# Patient Record
Sex: FEMALE | Race: BLACK OR AFRICAN AMERICAN | NOT HISPANIC OR LATINO | URBAN - METROPOLITAN AREA
[De-identification: names, ages, dates, MRNs, and addresses within clinical notes are randomized per-mention and may not be internally consistent; named-entity substitution may affect disease eponyms.]

---

## 2019-06-27 ENCOUNTER — OUTPATIENT (OUTPATIENT)
Dept: OUTPATIENT SERVICES | Facility: HOSPITAL | Age: 54
LOS: 1 days | Discharge: HOME | End: 2019-06-27

## 2019-06-27 VITALS
DIASTOLIC BLOOD PRESSURE: 70 MMHG | SYSTOLIC BLOOD PRESSURE: 129 MMHG | TEMPERATURE: 98 F | OXYGEN SATURATION: 99 % | RESPIRATION RATE: 16 BRPM | WEIGHT: 171.96 LBS | HEART RATE: 65 BPM | HEIGHT: 69 IN

## 2019-06-27 DIAGNOSIS — S93.124A DISLOCATION OF METATARSOPHALANGEAL JOINT OF RIGHT LESSER TOE(S), INITIAL ENCOUNTER: ICD-10-CM

## 2019-06-27 DIAGNOSIS — Z01.818 ENCOUNTER FOR OTHER PREPROCEDURAL EXAMINATION: ICD-10-CM

## 2019-06-27 DIAGNOSIS — M20.11 HALLUX VALGUS (ACQUIRED), RIGHT FOOT: ICD-10-CM

## 2019-06-27 DIAGNOSIS — Z01.812 ENCOUNTER FOR PREPROCEDURAL LABORATORY EXAMINATION: ICD-10-CM

## 2019-06-27 LAB
ANION GAP SERPL CALC-SCNC: 5 MMOL/L — SIGNIFICANT CHANGE UP (ref 5–17)
BUN SERPL-MCNC: 13 MG/DL — SIGNIFICANT CHANGE UP (ref 7–23)
CALCIUM SERPL-MCNC: 9.4 MG/DL — SIGNIFICANT CHANGE UP (ref 8.5–10.1)
CHLORIDE SERPL-SCNC: 109 MMOL/L — HIGH (ref 96–108)
CO2 SERPL-SCNC: 30 MMOL/L — SIGNIFICANT CHANGE UP (ref 22–31)
CREAT SERPL-MCNC: 0.78 MG/DL — SIGNIFICANT CHANGE UP (ref 0.5–1.3)
GLUCOSE SERPL-MCNC: 97 MG/DL — SIGNIFICANT CHANGE UP (ref 70–99)
HCT VFR BLD CALC: 40.7 % — SIGNIFICANT CHANGE UP (ref 34.5–45)
HGB BLD-MCNC: 13 G/DL — SIGNIFICANT CHANGE UP (ref 11.5–15.5)
MCHC RBC-ENTMCNC: 28.6 PG — SIGNIFICANT CHANGE UP (ref 27–34)
MCHC RBC-ENTMCNC: 31.9 GM/DL — LOW (ref 32–36)
MCV RBC AUTO: 89.6 FL — SIGNIFICANT CHANGE UP (ref 80–100)
NRBC # BLD: 0 /100 WBCS — SIGNIFICANT CHANGE UP (ref 0–0)
PLATELET # BLD AUTO: 215 K/UL — SIGNIFICANT CHANGE UP (ref 150–400)
POTASSIUM SERPL-MCNC: 4.2 MMOL/L — SIGNIFICANT CHANGE UP (ref 3.5–5.3)
POTASSIUM SERPL-SCNC: 4.2 MMOL/L — SIGNIFICANT CHANGE UP (ref 3.5–5.3)
RBC # BLD: 4.54 M/UL — SIGNIFICANT CHANGE UP (ref 3.8–5.2)
RBC # FLD: 12.5 % — SIGNIFICANT CHANGE UP (ref 10.3–14.5)
SODIUM SERPL-SCNC: 144 MMOL/L — SIGNIFICANT CHANGE UP (ref 135–145)
WBC # BLD: 3.36 K/UL — LOW (ref 3.8–10.5)
WBC # FLD AUTO: 3.36 K/UL — LOW (ref 3.8–10.5)

## 2019-06-27 RX ORDER — SODIUM CHLORIDE 9 MG/ML
3 INJECTION INTRAMUSCULAR; INTRAVENOUS; SUBCUTANEOUS EVERY 8 HOURS
Refills: 0 | Status: DISCONTINUED | OUTPATIENT
Start: 2019-07-05 | End: 2019-07-05

## 2019-06-27 NOTE — H&P PST ADULT - ASSESSMENT
55 y/o healthy female, with c/o of foot pain for over a yr. Started noticing that the "2nd toe was going over the big toe" for the last few months. Seen by Dr Purcell, scheduled for right foot bunionectomy and hammer toe correction of 2nd toe. Pre op testing today.  Medical eval not needed, well exam.

## 2019-06-27 NOTE — H&P PST ADULT - NSICDXPROBLEM_GEN_ALL_CORE_FT
PROBLEM DIAGNOSES  Problem: Hallux valgus with bunions, right  Assessment and Plan: Right foot bunionectomy with 2nd digit fusion with flexor tendon transfer

## 2019-06-27 NOTE — H&P PST ADULT - HISTORY OF PRESENT ILLNESS
55 y/o healthy female, with c/o of foot pain for over a yr. Started noticing that the "2nd toe was going over the big toe" for the last few months. Seen by Dr Purcell, scheduled for right foot bunionectomy and hammer toe correction of 2nd toe. Pre op testing today.

## 2019-06-27 NOTE — H&P PST ADULT - MUSCULOSKELETAL COMMENTS
right foot pain see HPI right foot bunion and 2nd hammertoe, pain wearing shoes, using tape to hold the toe in place while wearing flat shoes

## 2019-07-05 ENCOUNTER — OUTPATIENT (OUTPATIENT)
Dept: OUTPATIENT SERVICES | Facility: HOSPITAL | Age: 54
LOS: 1 days | Discharge: HOME | End: 2019-07-05
Payer: COMMERCIAL

## 2019-07-05 VITALS
DIASTOLIC BLOOD PRESSURE: 76 MMHG | RESPIRATION RATE: 14 BRPM | TEMPERATURE: 98 F | OXYGEN SATURATION: 99 % | HEART RATE: 58 BPM | SYSTOLIC BLOOD PRESSURE: 155 MMHG

## 2019-07-05 VITALS
WEIGHT: 171.96 LBS | HEIGHT: 68 IN | OXYGEN SATURATION: 100 % | SYSTOLIC BLOOD PRESSURE: 151 MMHG | DIASTOLIC BLOOD PRESSURE: 70 MMHG | HEART RATE: 59 BPM | TEMPERATURE: 97 F | RESPIRATION RATE: 12 BRPM

## 2019-07-05 PROCEDURE — 73620 X-RAY EXAM OF FOOT: CPT | Mod: 26,RT

## 2019-07-05 RX ORDER — HYDROMORPHONE HYDROCHLORIDE 2 MG/ML
0.5 INJECTION INTRAMUSCULAR; INTRAVENOUS; SUBCUTANEOUS
Refills: 0 | Status: DISCONTINUED | OUTPATIENT
Start: 2019-07-05 | End: 2019-07-05

## 2019-07-05 RX ORDER — SODIUM CHLORIDE 9 MG/ML
1000 INJECTION, SOLUTION INTRAVENOUS
Refills: 0 | Status: DISCONTINUED | OUTPATIENT
Start: 2019-07-05 | End: 2019-07-05

## 2019-07-05 RX ADMIN — SODIUM CHLORIDE 75 MILLILITER(S): 9 INJECTION, SOLUTION INTRAVENOUS at 18:16

## 2019-07-05 NOTE — ASU DISCHARGE PLAN (ADULT/PEDIATRIC) - CALL YOUR DOCTOR IF YOU HAVE ANY OF THE FOLLOWING:
Fever greater than (need to indicate Fahrenheit or Celsius)/Nausea and vomiting that does not stop/Bleeding that does not stop/Pain not relieved by Medications

## 2019-07-05 NOTE — ASU DISCHARGE PLAN (ADULT/PEDIATRIC) - ASU DC SPECIAL INSTRUCTIONSFT
- keep dressing clean dry and intact   - weight bear as tolerated to the right heel in a post-op shoe   - follow up with Dr. Purcell in office next week (appointment made)

## 2019-07-09 DIAGNOSIS — M20.11 HALLUX VALGUS (ACQUIRED), RIGHT FOOT: ICD-10-CM

## 2019-07-09 DIAGNOSIS — M20.41 OTHER HAMMER TOE(S) (ACQUIRED), RIGHT FOOT: ICD-10-CM

## 2023-11-12 NOTE — H&P PST ADULT - PRO ANTICIPATED DISCH DISP
TANA Akins  Maddison Wiley  : 1986  MRN: [id number]  CSN: [id number]    Labor progress note    Subjective   She is comfortable with the epidural.  She is nauseated after her redose of epidural.      Objective   Min/max vitals past 24 hours:  Temp  Min: 97.8 °F (36.6 °C)  Max: 98.8 °F (37.1 °C)   BP  Min: 103/59  Max: 159/91   Pulse  Min: 72  Max: 117   Resp  Min: 16  Max: 18        FHT's: Intermittent cat 2 FHT (Vds) with moderate variability and + accels   Cervix: was checked (by me): 7-8 cm / 90 % / 0   Contractions: regular every 2 minutes        Assessment   IUP at 38w0d  Progressing in labor  Fetal status reassuring     Plan   Expectant management  AROM - clear and port wine fluid seen   Recheck cervix in 1-2 hours    Randi Almeida MD  2023  08:21 EST           home

## 2024-05-17 PROBLEM — M21.611 BUNION OF RIGHT FOOT: Chronic | Status: ACTIVE | Noted: 2019-06-27

## 2024-05-20 ENCOUNTER — OUTPATIENT (OUTPATIENT)
Dept: OUTPATIENT SERVICES | Facility: HOSPITAL | Age: 59
LOS: 1 days | End: 2024-05-20
Payer: COMMERCIAL

## 2024-05-20 VITALS
DIASTOLIC BLOOD PRESSURE: 65 MMHG | HEIGHT: 69 IN | RESPIRATION RATE: 16 BRPM | OXYGEN SATURATION: 100 % | WEIGHT: 164.02 LBS | TEMPERATURE: 98 F | HEART RATE: 71 BPM | SYSTOLIC BLOOD PRESSURE: 143 MMHG

## 2024-05-20 DIAGNOSIS — D25.9 LEIOMYOMA OF UTERUS, UNSPECIFIED: ICD-10-CM

## 2024-05-20 DIAGNOSIS — N95.0 POSTMENOPAUSAL BLEEDING: ICD-10-CM

## 2024-05-20 DIAGNOSIS — Z01.818 ENCOUNTER FOR OTHER PREPROCEDURAL EXAMINATION: ICD-10-CM

## 2024-05-20 DIAGNOSIS — Z98.890 OTHER SPECIFIED POSTPROCEDURAL STATES: Chronic | ICD-10-CM

## 2024-05-20 LAB — BLD GP AB SCN SERPL QL: SIGNIFICANT CHANGE UP

## 2024-05-20 RX ORDER — SODIUM CHLORIDE 9 MG/ML
3 INJECTION INTRAMUSCULAR; INTRAVENOUS; SUBCUTANEOUS EVERY 8 HOURS
Refills: 0 | Status: DISCONTINUED | OUTPATIENT
Start: 2024-06-06 | End: 2024-06-20

## 2024-05-20 NOTE — H&P PST ADULT - PEDAL EDEMA SEVERITY
How Severe Are Your Spot(S)?: mild Have Your Spot(S) Been Treated In The Past?: has not been treated Hpi Title: Evaluation of Skin Lesions 2+

## 2024-05-20 NOTE — H&P PST ADULT - CARDIOVASCULAR
details… regular rate and rhythm/S1 S2 present/no gallops/no rub/no murmur/normal PMI/no pedal edema/pedal edema

## 2024-05-20 NOTE — H&P PST ADULT - NS MD HP INPLANTS MED DEV
Patient states that she would like to try pain management can you please sign the referral thanks. hardware in right foot great toe

## 2024-05-20 NOTE — H&P PST ADULT - ATTENDING COMMENTS
59 yo with PMB presents for hysteroscopy, D&C and possible polypectomy  U 13.3 fibroids, EMS not well visualized,   Pt can't tolerate vaginal examination, she is penny.  No anemia    r/b/a were extensively discussed, risks including but not limited to pain, bleeding, infection, damage to adjacent organs, VTE, need for laparoscopy and laparotomy, inability to complete the procedure, need for blood transfusion.  Patient voiced understanding and agreed with the plan.     pmedvedeva

## 2024-05-20 NOTE — H&P PST ADULT - ASSESSMENT
59 y/o female with no significant PMH is diagnosed with postmenopausal bleeding, leiomyoma of the uterus, unspecified  STOP BANG SCORE IS 1

## 2024-05-20 NOTE — H&P PST ADULT - PROBLEM SELECTOR PLAN 2
Scheduled for dilation and curettage with hysteroscopy, possible polypectomy on 6/6/2024  Preoperative instructions discussed and given to patient.   Patient agrees to follow up with surgeon's office for instructions prior to surgery   Discussed preprocedure skin preparation using  dial soap and chlorhexidine gluconate 4% solution three days prior to  surgery - including the day of surgery  Instructed patient to avoid aspirin and aspirin products, over the counter medications such as vitamins and herbal medications, one week prior to surgery.  Take Tylenol as needed for pain  Follow up with PCP postoperatively for management of chronic conditions  Patient verbalized understanding of instructions

## 2024-05-20 NOTE — H&P PST ADULT - GASTROINTESTINAL
normal/soft/nontender/nondistended/normal active bowel sounds/no organomegaly/no palpable andrea/no masses palpable negative

## 2024-05-20 NOTE — H&P PST ADULT - HISTORY OF PRESENT ILLNESS
57 y/o female with no significant PMH presents with c/o postmenopausal bleeding on and off since 2/6/2024. She also complains of spotting daily. She is diagnosed with postmenopausal bleeding, leiomyoma of the uterus, unspecified and is scheduled for dilation and curettage with hysteroscopy, possible polypectomy on 6/6/2024

## 2024-05-21 PROCEDURE — 86900 BLOOD TYPING SEROLOGIC ABO: CPT

## 2024-05-21 PROCEDURE — 36415 COLL VENOUS BLD VENIPUNCTURE: CPT

## 2024-05-21 PROCEDURE — 86901 BLOOD TYPING SEROLOGIC RH(D): CPT

## 2024-05-21 PROCEDURE — G0463: CPT

## 2024-05-21 PROCEDURE — 86850 RBC ANTIBODY SCREEN: CPT

## 2024-06-06 ENCOUNTER — OUTPATIENT (OUTPATIENT)
Dept: OUTPATIENT SERVICES | Facility: HOSPITAL | Age: 59
LOS: 1 days | End: 2024-06-06
Payer: COMMERCIAL

## 2024-06-06 VITALS
HEART RATE: 61 BPM | HEIGHT: 69 IN | RESPIRATION RATE: 16 BRPM | TEMPERATURE: 98 F | OXYGEN SATURATION: 100 % | DIASTOLIC BLOOD PRESSURE: 88 MMHG | WEIGHT: 164.02 LBS | SYSTOLIC BLOOD PRESSURE: 145 MMHG

## 2024-06-06 VITALS
OXYGEN SATURATION: 98 % | RESPIRATION RATE: 16 BRPM | HEART RATE: 55 BPM | DIASTOLIC BLOOD PRESSURE: 73 MMHG | SYSTOLIC BLOOD PRESSURE: 146 MMHG | TEMPERATURE: 98 F

## 2024-06-06 DIAGNOSIS — D25.9 LEIOMYOMA OF UTERUS, UNSPECIFIED: ICD-10-CM

## 2024-06-06 DIAGNOSIS — N95.0 POSTMENOPAUSAL BLEEDING: ICD-10-CM

## 2024-06-06 DIAGNOSIS — Z98.890 OTHER SPECIFIED POSTPROCEDURAL STATES: Chronic | ICD-10-CM

## 2024-06-06 DIAGNOSIS — Z01.818 ENCOUNTER FOR OTHER PREPROCEDURAL EXAMINATION: ICD-10-CM

## 2024-06-06 LAB — BLD GP AB SCN SERPL QL: SIGNIFICANT CHANGE UP

## 2024-06-06 PROCEDURE — 88305 TISSUE EXAM BY PATHOLOGIST: CPT | Mod: 26

## 2024-06-06 PROCEDURE — 86850 RBC ANTIBODY SCREEN: CPT

## 2024-06-06 PROCEDURE — 86901 BLOOD TYPING SEROLOGIC RH(D): CPT

## 2024-06-06 PROCEDURE — 86900 BLOOD TYPING SEROLOGIC ABO: CPT

## 2024-06-06 PROCEDURE — 36415 COLL VENOUS BLD VENIPUNCTURE: CPT

## 2024-06-06 PROCEDURE — 58558 HYSTEROSCOPY BIOPSY: CPT

## 2024-06-06 PROCEDURE — 88305 TISSUE EXAM BY PATHOLOGIST: CPT

## 2024-06-06 RX ORDER — HYDROMORPHONE HYDROCHLORIDE 2 MG/ML
0.5 INJECTION INTRAMUSCULAR; INTRAVENOUS; SUBCUTANEOUS
Refills: 0 | Status: DISCONTINUED | OUTPATIENT
Start: 2024-06-06 | End: 2024-06-06

## 2024-06-06 RX ORDER — ONDANSETRON 8 MG/1
8 TABLET, FILM COATED ORAL EVERY 8 HOURS
Refills: 0 | Status: DISCONTINUED | OUTPATIENT
Start: 2024-06-06 | End: 2024-06-20

## 2024-06-06 RX ORDER — ONDANSETRON 8 MG/1
4 TABLET, FILM COATED ORAL ONCE
Refills: 0 | Status: DISCONTINUED | OUTPATIENT
Start: 2024-06-06 | End: 2024-06-06

## 2024-06-06 RX ORDER — HYDROMORPHONE HYDROCHLORIDE 2 MG/ML
1 INJECTION INTRAMUSCULAR; INTRAVENOUS; SUBCUTANEOUS
Refills: 0 | Status: DISCONTINUED | OUTPATIENT
Start: 2024-06-06 | End: 2024-06-06

## 2024-06-06 RX ORDER — VITAMIN E 100 UNIT
0 CAPSULE ORAL
Qty: 0 | Refills: 0 | DISCHARGE

## 2024-06-06 RX ORDER — SIMETHICONE 80 MG/1
80 TABLET, CHEWABLE ORAL EVERY 6 HOURS
Refills: 0 | Status: DISCONTINUED | OUTPATIENT
Start: 2024-06-06 | End: 2024-06-20

## 2024-06-06 RX ORDER — FOLIC ACID/VIT B COMPLEX AND C 400 MCG
0 TABLET ORAL
Qty: 0 | Refills: 0 | DISCHARGE

## 2024-06-06 RX ORDER — SODIUM CHLORIDE 9 MG/ML
1000 INJECTION, SOLUTION INTRAVENOUS
Refills: 0 | Status: DISCONTINUED | OUTPATIENT
Start: 2024-06-06 | End: 2024-06-06

## 2024-06-06 RX ORDER — OMEGA-3 ACID ETHYL ESTERS 1 G
1 CAPSULE ORAL
Refills: 0 | DISCHARGE

## 2024-06-06 RX ORDER — IBUPROFEN 200 MG
1 TABLET ORAL
Qty: 12 | Refills: 0
Start: 2024-06-06 | End: 2024-06-08

## 2024-06-06 RX ADMIN — SODIUM CHLORIDE 75 MILLILITER(S): 9 INJECTION, SOLUTION INTRAVENOUS at 15:27

## 2024-06-06 NOTE — ASU DISCHARGE PLAN (ADULT/PEDIATRIC) - ASU DC SPECIAL INSTRUCTIONSFT
No sex, nothing in vagina, no heavy lifting, no pushing, eat high fiber foods, and  ambulation daily as tolerated.  Shower only  See your gynecologist in 2 weeks  for follow up

## 2024-06-06 NOTE — ASU DISCHARGE PLAN (ADULT/PEDIATRIC) - A. DRIVE A CAR, OPERATE POWER TOOLS OR MACHINERY
Statement Selected Admission Reconciliation is Completed  Discharge Reconciliation is Not Complete Admission Reconciliation is Completed  Discharge Reconciliation is Completed

## 2024-06-06 NOTE — ASU DISCHARGE PLAN (ADULT/PEDIATRIC) - CARE PROVIDER_API CALL
Mishel Alfaro  Obstetrics and Gynecology  [address] [place] [address]  Phone: [phone]  Fax: [phone]  Established Patient  Follow Up Time: 2 weeks

## 2024-06-06 NOTE — ASU DISCHARGE PLAN (ADULT/PEDIATRIC) - NS MD DC FALL RISK RISK
For information on Fall & Injury Prevention, visit: [email or web address] OR  [email or web address] OR  https://www.cdc.gov/steadi/patient.html

## 2024-06-06 NOTE — ASU PATIENT PROFILE, ADULT - FALL HARM RISK - ATTEMPT OOB
S/W ELBA  I explained we attempted to call her but couldn't reach her. Her MRI was approved, but our office scheduled it at [address] [place] [address] for 6/9/2022 at 10:00am. A follow up appointment was also scheduled to review the results with Dr. Letitia Barrientos. Patient voiced understanding of the conversation and will contact the office with further questions or concerns. No

## 2024-06-11 LAB — SURGICAL PATHOLOGY STUDY: SIGNIFICANT CHANGE UP

## 2024-11-12 ENCOUNTER — OUTPATIENT (OUTPATIENT)
Dept: OUTPATIENT SERVICES | Facility: HOSPITAL | Age: 59
LOS: 1 days | End: 2024-11-12
Payer: COMMERCIAL

## 2024-11-12 VITALS
TEMPERATURE: 98 F | OXYGEN SATURATION: 100 % | HEART RATE: 70 BPM | DIASTOLIC BLOOD PRESSURE: 72 MMHG | SYSTOLIC BLOOD PRESSURE: 129 MMHG | WEIGHT: 164.91 LBS | HEIGHT: 69 IN | RESPIRATION RATE: 18 BRPM

## 2024-11-12 DIAGNOSIS — Z01.818 ENCOUNTER FOR OTHER PREPROCEDURAL EXAMINATION: ICD-10-CM

## 2024-11-12 DIAGNOSIS — D25.9 LEIOMYOMA OF UTERUS, UNSPECIFIED: ICD-10-CM

## 2024-11-12 DIAGNOSIS — Z98.890 OTHER SPECIFIED POSTPROCEDURAL STATES: Chronic | ICD-10-CM

## 2024-11-12 DIAGNOSIS — N95.0 POSTMENOPAUSAL BLEEDING: ICD-10-CM

## 2024-11-12 LAB — BLD GP AB SCN SERPL QL: SIGNIFICANT CHANGE UP

## 2024-11-12 NOTE — H&P PST ADULT - ATTENDING COMMENTS
58 yo  presents for an elective LINCOLN-BSO, vault suspension, cystoscopy due to fibroids and recurrent PMB.  U 14 cm by sono, with multiple fibroids, the largest is 6 cm, EMS not well visualized.  Hysteroscopy, D&C was performed inactive endometrium and endometrial polyp  Patient is virgin   No h/o abnl PAPs, last 2024 NILM    r/b/a were extensively discussed, risks including but not limited to pain, bleeding, infection, damage to adjacent organs, VTE, need for additional laparoscopy and laparotomy, inability to complete the procedure, need for blood transfusion.  Patient voiced understanding and agreed with the plan.     pmedvedeva

## 2024-11-12 NOTE — H&P PST ADULT - PROBLEM SELECTOR PLAN 2
Scheduled for total abdominal hysterectomy with bilateral salpingo-oophorectomy, vault suspension and cystoscopy on 11/21/2024

## 2024-11-12 NOTE — H&P PST ADULT - HISTORY OF PRESENT ILLNESS
This is a 60 y/o female with history of postmenopausal bleeding  with abdominal cramps and clots ongoing since 02/2024. She had dilation and curettage with hysteroscopy 07/2024, but bleeding continued. She is diagnosed with leiomyoma of uterus, unspecified and postmenopausal bleeding, scheduled for total abdominal hysterectomy with bilateral salpingo-oophorectomy, vault suspension and cystoscopy on 11/21/2024

## 2024-11-12 NOTE — H&P PST ADULT - NSICDXPASTSURGICALHX_GEN_ALL_CORE_FT
PAST SURGICAL HISTORY:  History of bunionectomy of right great toe     S/P dilation and curettage

## 2024-11-12 NOTE — H&P PST ADULT - PROBLEM SELECTOR PLAN 1
Scheduled for total abdominal hysterectomy with bilateral salpingo-oophorectomy, vault suspension and cystoscopy on 11/21/2024  Preoperative instructions discussed and given to patient.   NPO between midnight and the time of surgery  Follow up with surgeon's and PCP's office for instructions prior to surgery   Instructed patient to avoid aspirin and aspirin products, over the counter medications such as vitamins and herbal medications one week prior to surgery.  Avoid tobacco use the morning of surgery  Take Tylenol as needed for pain  Follow up with PCP postoperatively for management of chronic conditions  Discussed preprocedure skin preparation using  dial soap and chlorhexidine gluconate 4% solution three days prior to  surgery - including the day of surgery  Patient verbalized understanding of instructions  Type and screen, hemoglobin A1C here today

## 2024-11-12 NOTE — H&P PST ADULT - PATIENT'S PREFERRED PRONOUN
"Occupational Therapy Evaluation completed.   Functional Status:  Min/CGA with ADLs and txfs  Plan of Care: Will benefit from Occupational Therapy 2 times per week  Discharge Recommendations:  Equipment: Will Continue to Assess for Equipment Needs. Post-acute therapy Currently anticipate no further skilled therapy needs once patient is discharged from the inpatient setting.    See \"Rehab Therapy-Acute\" Patient Summary Report for complete documentation.    " Her/She

## 2024-11-12 NOTE — H&P PST ADULT - ASSESSMENT
This is a 58 y/o female with history of postmenopausal bleeding  with abdominal cramps and clots ongoing since 02/2024. She had dilation and curettage with hysteroscopy 07/2024, but bleeding continued. She is diagnosed with leiomyoma of uterus, unspecified and postmenopausal bleeding    STOP BANG SCORE IS 2

## 2024-11-13 PROCEDURE — 36415 COLL VENOUS BLD VENIPUNCTURE: CPT

## 2024-11-13 PROCEDURE — 83036 HEMOGLOBIN GLYCOSYLATED A1C: CPT

## 2024-11-13 PROCEDURE — 86850 RBC ANTIBODY SCREEN: CPT

## 2024-11-13 PROCEDURE — G0463: CPT

## 2024-11-13 PROCEDURE — 86900 BLOOD TYPING SEROLOGIC ABO: CPT

## 2024-11-13 PROCEDURE — 86901 BLOOD TYPING SEROLOGIC RH(D): CPT

## 2024-11-14 LAB
A1C WITH ESTIMATED AVERAGE GLUCOSE RESULT: 5.3 % — SIGNIFICANT CHANGE UP (ref 4–5.6)
ESTIMATED AVERAGE GLUCOSE: 105 MG/DL — SIGNIFICANT CHANGE UP (ref 68–114)

## 2024-11-21 ENCOUNTER — INPATIENT (INPATIENT)
Facility: HOSPITAL | Age: 59
LOS: 1 days | Discharge: HOME | DRG: 743 | End: 2024-11-23
Attending: OBSTETRICS & GYNECOLOGY | Admitting: OBSTETRICS & GYNECOLOGY
Payer: COMMERCIAL

## 2024-11-21 VITALS
SYSTOLIC BLOOD PRESSURE: 130 MMHG | DIASTOLIC BLOOD PRESSURE: 81 MMHG | WEIGHT: 164.91 LBS | RESPIRATION RATE: 16 BRPM | OXYGEN SATURATION: 100 % | HEIGHT: 69 IN | TEMPERATURE: 98 F | HEART RATE: 77 BPM

## 2024-11-21 DIAGNOSIS — Z98.890 OTHER SPECIFIED POSTPROCEDURAL STATES: Chronic | ICD-10-CM

## 2024-11-21 DIAGNOSIS — D25.9 LEIOMYOMA OF UTERUS, UNSPECIFIED: ICD-10-CM

## 2024-11-21 LAB — BLD GP AB SCN SERPL QL: SIGNIFICANT CHANGE UP

## 2024-11-21 PROCEDURE — 88307 TISSUE EXAM BY PATHOLOGIST: CPT | Mod: 26

## 2024-11-21 PROCEDURE — 88342 IMHCHEM/IMCYTCHM 1ST ANTB: CPT | Mod: 26

## 2024-11-21 PROCEDURE — 88341 IMHCHEM/IMCYTCHM EA ADD ANTB: CPT | Mod: 26

## 2024-11-21 RX ORDER — CHLORHEXIDINE GLUCONATE 1.2 MG/ML
1 RINSE ORAL ONCE
Refills: 0 | Status: COMPLETED | OUTPATIENT
Start: 2024-11-21 | End: 2024-11-21

## 2024-11-21 RX ORDER — HYDROMORPHONE HYDROCHLORIDE 2 MG/1
1 TABLET ORAL
Refills: 0 | Status: DISCONTINUED | OUTPATIENT
Start: 2024-11-21 | End: 2024-11-21

## 2024-11-21 RX ORDER — KETOROLAC TROMETHAMINE 30 MG/ML
30 INJECTION INTRAMUSCULAR; INTRAVENOUS EVERY 8 HOURS
Refills: 0 | Status: DISCONTINUED | OUTPATIENT
Start: 2024-11-21 | End: 2024-11-22

## 2024-11-21 RX ORDER — SIMETHICONE 125 MG
80 CAPSULE ORAL EVERY 6 HOURS
Refills: 0 | Status: DISCONTINUED | OUTPATIENT
Start: 2024-11-21 | End: 2024-11-23

## 2024-11-21 RX ORDER — ONDANSETRON HYDROCHLORIDE 4 MG/1
4 TABLET, FILM COATED ORAL ONCE
Refills: 0 | Status: DISCONTINUED | OUTPATIENT
Start: 2024-11-21 | End: 2024-11-21

## 2024-11-21 RX ORDER — ACETAMINOPHEN 500MG 500 MG/1
1000 TABLET, COATED ORAL EVERY 6 HOURS
Refills: 0 | Status: DISCONTINUED | OUTPATIENT
Start: 2024-11-21 | End: 2024-11-23

## 2024-11-21 RX ORDER — HYDROMORPHONE HYDROCHLORIDE 2 MG/1
0.5 TABLET ORAL
Refills: 0 | Status: DISCONTINUED | OUTPATIENT
Start: 2024-11-21 | End: 2024-11-21

## 2024-11-21 RX ORDER — HEPARIN SODIUM,PORCINE 1000/ML
5000 VIAL (ML) INJECTION EVERY 12 HOURS
Refills: 0 | Status: DISCONTINUED | OUTPATIENT
Start: 2024-11-21 | End: 2024-11-22

## 2024-11-21 RX ORDER — 0.9 % SODIUM CHLORIDE 0.9 %
1000 INTRAVENOUS SOLUTION INTRAVENOUS
Refills: 0 | Status: DISCONTINUED | OUTPATIENT
Start: 2024-11-21 | End: 2024-11-21

## 2024-11-21 RX ORDER — OXYCODONE HYDROCHLORIDE 30 MG/1
5 TABLET ORAL
Refills: 0 | Status: DISCONTINUED | OUTPATIENT
Start: 2024-11-21 | End: 2024-11-23

## 2024-11-21 RX ORDER — SENNOSIDES 8.6 MG
1 TABLET ORAL AT BEDTIME
Refills: 0 | Status: DISCONTINUED | OUTPATIENT
Start: 2024-11-21 | End: 2024-11-23

## 2024-11-21 RX ORDER — ONDANSETRON HYDROCHLORIDE 4 MG/1
8 TABLET, FILM COATED ORAL EVERY 8 HOURS
Refills: 0 | Status: DISCONTINUED | OUTPATIENT
Start: 2024-11-21 | End: 2024-11-23

## 2024-11-21 RX ORDER — SODIUM CHLORIDE 9 MG/ML
3 INJECTION, SOLUTION INTRAMUSCULAR; INTRAVENOUS; SUBCUTANEOUS EVERY 8 HOURS
Refills: 0 | Status: DISCONTINUED | OUTPATIENT
Start: 2024-11-21 | End: 2024-11-21

## 2024-11-21 RX ADMIN — KETOROLAC TROMETHAMINE 30 MILLIGRAM(S): 30 INJECTION INTRAMUSCULAR; INTRAVENOUS at 23:02

## 2024-11-21 RX ADMIN — ACETAMINOPHEN 500MG 1000 MILLIGRAM(S): 500 TABLET, COATED ORAL at 21:29

## 2024-11-21 RX ADMIN — ACETAMINOPHEN 500MG 1000 MILLIGRAM(S): 500 TABLET, COATED ORAL at 18:08

## 2024-11-21 RX ADMIN — ACETAMINOPHEN 500MG 1000 MILLIGRAM(S): 500 TABLET, COATED ORAL at 23:40

## 2024-11-21 RX ADMIN — HYDROMORPHONE HYDROCHLORIDE 0.5 MILLIGRAM(S): 2 TABLET ORAL at 21:29

## 2024-11-21 RX ADMIN — CHLORHEXIDINE GLUCONATE 1 APPLICATION(S): 1.2 RINSE ORAL at 07:58

## 2024-11-21 RX ADMIN — Medication 5000 UNIT(S): at 18:07

## 2024-11-21 RX ADMIN — KETOROLAC TROMETHAMINE 30 MILLIGRAM(S): 30 INJECTION INTRAMUSCULAR; INTRAVENOUS at 22:02

## 2024-11-21 RX ADMIN — Medication 75 MILLILITER(S): at 18:06

## 2024-11-21 RX ADMIN — HYDROMORPHONE HYDROCHLORIDE 0.5 MILLIGRAM(S): 2 TABLET ORAL at 18:07

## 2024-11-21 NOTE — ASU PREOP CHECKLIST - COMMENTS
pt. stated she "had one baby carrot at home at 3am today"---Dr. Randolph (anesthesiologist) and DR. Alfaro made aware

## 2024-11-21 NOTE — CHART NOTE - NSCHARTNOTEFT_GEN_A_CORE
Pt seen at bedside offers no complaints. Denies n/v, cp; sob; palpitations; or dizziness    T(C): 36.4 (11-21-24 @ 15:26), Max: 36.5 (11-21-24 @ 13:25)  HR: 58 (11-21-24 @ 15:26) (53 - 77)  BP: 122/63 (11-21-24 @ 15:26) (104/46 - 130/81)  RR: 18 (11-21-24 @ 15:26) (14 - 21)  SpO2: 96% (11-21-24 @ 15:26) (94% - 100%)    abd: soft/nt, fundus firm, dressing in place C/D/I  pelvic: minimal lochia  ext: venodynes in place; no calf pain    I&O's Detail    21 Nov 2024 07:01  -  21 Nov 2024 17:06  --------------------------------------------------------  IN:    Lactated Ringers Bolus: 2400 mL  Total IN: 2400 mL    OUT:    Blood Loss (mL): 175 mL    Indwelling Catheter - Urethral (mL): 700 mL  Total OUT: 875 mL    Total NET: 1525 mL        a/p POD #0 s/p  LINCOLN/BSO, sacral suspension, cystoscopy  cont close monitor   cont post op care Pt seen at bedside offers no complaints. Denies n/v, cp; sob; palpitations; or dizziness    T(C): 36.4 (11-21-24 @ 15:26), Max: 36.5 (11-21-24 @ 13:25)  HR: 58 (11-21-24 @ 15:26) (53 - 77)  BP: 122/63 (11-21-24 @ 15:26) (104/46 - 130/81)  RR: 18 (11-21-24 @ 15:26) (14 - 21)  SpO2: 96% (11-21-24 @ 15:26) (94% - 100%)    abd: soft/nt, fundus firm, dressing in place C/D/I one small spot of blood. right side  pelvic: minimal blood  ext: venodynes in place; no calf pain    I&O's Detail    21 Nov 2024 07:01  -  21 Nov 2024 17:06  --------------------------------------------------------  IN:    Lactated Ringers Bolus: 2400 mL  Total IN: 2400 mL    OUT:    Blood Loss (mL): 175 mL    Indwelling Catheter - Urethral (mL): 700 mL  Total OUT: 875 mL + 250ml    Total NET: 1725 mL        a/p POD #0 s/p  LINCOLN/BSO, sacral suspension, cystoscopy  cont close monitor   cont post op care  d/w Dr. Alfaro

## 2024-11-21 NOTE — BRIEF OPERATIVE NOTE - OPERATION/FINDINGS
EUA: Bulky, multifibroid uterus. External genitalia grossly normal   Laparotomy: Approx 15 week sized multifibroid, adenomyotic uterus. Bilateral tubes and ovaries grossly normal.   Cysto: + bubble at bladder dome. + bilateral ureteral jets. No suture material visualized in bladder.

## 2024-11-21 NOTE — BRIEF OPERATIVE NOTE - NSICDXBRIEFPROCEDURE_GEN_ALL_CORE_FT
PROCEDURES:  Exploratory laparotomy with total abdominal hysterectomy and bilateral salpingo-oophorectomy 21-Nov-2024 13:35:24  Lakesha Patel  Cystoscopy 21-Nov-2024 13:35:33  Lakesha Patel  Uterosacral colposuspension 21-Nov-2024 13:35:53  Lakesha Patel

## 2024-11-21 NOTE — PATIENT PROFILE ADULT - FALL HARM RISK - UNIVERSAL INTERVENTIONS
Bed in lowest position, wheels locked, appropriate side rails in place/Call bell, personal items and telephone in reach/Instruct patient to call for assistance before getting out of bed or chair/Non-slip footwear when patient is out of bed/[place] to call system/Physically safe environment - no spills, clutter or unnecessary equipment/Purposeful Proactive Rounding/Room/bathroom lighting operational, light cord in reach

## 2024-11-21 NOTE — PATIENT PROFILE ADULT - CENTRAL VENOUS CATHETER/PICC LINE
PHYSICAL THERAPY     05/24/23 1454   Note Type   Note type Screen  (Patient independent with transfers and gait and does not require skilled PT services as per OT evaluation  TIA symptoms have resolved    DC PT)   Licensure   [place] License Number  Az Angulo, PT 4 0 [id number] [id number] no

## 2024-11-21 NOTE — BRIEF OPERATIVE NOTE - COMMENTS
Dictation #: [id number] Bilateral TAP blocks performed intra-op using 20cc Marcaine in 20cc NS    Dictation #: [id number]

## 2024-11-22 LAB
HCT VFR BLD CALC: 29.1 % — LOW (ref 34.5–45)
HGB BLD-MCNC: 9.5 G/DL — LOW (ref 11.5–15.5)
MCHC RBC-ENTMCNC: 28.4 PG — SIGNIFICANT CHANGE UP (ref 27–34)
MCHC RBC-ENTMCNC: 32.6 G/DL — SIGNIFICANT CHANGE UP (ref 32–36)
MCV RBC AUTO: 87.1 FL — SIGNIFICANT CHANGE UP (ref 80–100)
NRBC # BLD: 0 /100 WBCS — SIGNIFICANT CHANGE UP (ref 0–0)
PLATELET # BLD AUTO: 173 K/UL — SIGNIFICANT CHANGE UP (ref 150–400)
RBC # BLD: 3.34 M/UL — LOW (ref 3.8–5.2)
RBC # FLD: 12.7 % — SIGNIFICANT CHANGE UP (ref 10.3–14.5)
WBC # BLD: 7.99 K/UL — SIGNIFICANT CHANGE UP (ref 3.8–10.5)
WBC # FLD AUTO: 7.99 K/UL — SIGNIFICANT CHANGE UP (ref 3.8–10.5)

## 2024-11-22 RX ORDER — ENOXAPARIN SODIUM 30 MG/.3ML
30 INJECTION SUBCUTANEOUS EVERY 24 HOURS
Refills: 0 | Status: DISCONTINUED | OUTPATIENT
Start: 2024-11-22 | End: 2024-11-23

## 2024-11-22 RX ADMIN — Medication 5000 UNIT(S): at 05:12

## 2024-11-22 RX ADMIN — ACETAMINOPHEN 500MG 1000 MILLIGRAM(S): 500 TABLET, COATED ORAL at 15:40

## 2024-11-22 RX ADMIN — KETOROLAC TROMETHAMINE 30 MILLIGRAM(S): 30 INJECTION INTRAMUSCULAR; INTRAVENOUS at 06:11

## 2024-11-22 RX ADMIN — ACETAMINOPHEN 500MG 1000 MILLIGRAM(S): 500 TABLET, COATED ORAL at 23:51

## 2024-11-22 RX ADMIN — KETOROLAC TROMETHAMINE 30 MILLIGRAM(S): 30 INJECTION INTRAMUSCULAR; INTRAVENOUS at 05:11

## 2024-11-22 RX ADMIN — ENOXAPARIN SODIUM 30 MILLIGRAM(S): 30 INJECTION SUBCUTANEOUS at 15:40

## 2024-11-22 RX ADMIN — ACETAMINOPHEN 500MG 1000 MILLIGRAM(S): 500 TABLET, COATED ORAL at 05:11

## 2024-11-22 RX ADMIN — ACETAMINOPHEN 500MG 1000 MILLIGRAM(S): 500 TABLET, COATED ORAL at 00:36

## 2024-11-22 RX ADMIN — ACETAMINOPHEN 500MG 1000 MILLIGRAM(S): 500 TABLET, COATED ORAL at 06:11

## 2024-11-23 VITALS
TEMPERATURE: 98 F | RESPIRATION RATE: 18 BRPM | SYSTOLIC BLOOD PRESSURE: 114 MMHG | OXYGEN SATURATION: 97 % | DIASTOLIC BLOOD PRESSURE: 69 MMHG | HEART RATE: 70 BPM

## 2024-11-23 LAB
ANION GAP SERPL CALC-SCNC: 3 MMOL/L — LOW (ref 5–17)
BUN SERPL-MCNC: 9 MG/DL — SIGNIFICANT CHANGE UP (ref 7–18)
CALCIUM SERPL-MCNC: 8.4 MG/DL — SIGNIFICANT CHANGE UP (ref 8.4–10.5)
CHLORIDE SERPL-SCNC: 115 MMOL/L — HIGH (ref 96–108)
CO2 SERPL-SCNC: 27 MMOL/L — SIGNIFICANT CHANGE UP (ref 22–31)
CREAT SERPL-MCNC: 0.55 MG/DL — SIGNIFICANT CHANGE UP (ref 0.5–1.3)
EGFR: 104 ML/MIN/1.73M2 — SIGNIFICANT CHANGE UP
GLUCOSE SERPL-MCNC: 98 MG/DL — SIGNIFICANT CHANGE UP (ref 70–99)
HCT VFR BLD CALC: 25.5 % — LOW (ref 34.5–45)
HGB BLD-MCNC: 8.4 G/DL — LOW (ref 11.5–15.5)
MCHC RBC-ENTMCNC: 28.8 PG — SIGNIFICANT CHANGE UP (ref 27–34)
MCHC RBC-ENTMCNC: 32.9 G/DL — SIGNIFICANT CHANGE UP (ref 32–36)
MCV RBC AUTO: 87.3 FL — SIGNIFICANT CHANGE UP (ref 80–100)
NRBC # BLD: 0 /100 WBCS — SIGNIFICANT CHANGE UP (ref 0–0)
PLATELET # BLD AUTO: 160 K/UL — SIGNIFICANT CHANGE UP (ref 150–400)
POTASSIUM SERPL-MCNC: 3.9 MMOL/L — SIGNIFICANT CHANGE UP (ref 3.5–5.3)
POTASSIUM SERPL-SCNC: 3.9 MMOL/L — SIGNIFICANT CHANGE UP (ref 3.5–5.3)
RBC # BLD: 2.92 M/UL — LOW (ref 3.8–5.2)
RBC # FLD: 13 % — SIGNIFICANT CHANGE UP (ref 10.3–14.5)
SODIUM SERPL-SCNC: 145 MMOL/L — SIGNIFICANT CHANGE UP (ref 135–145)
WBC # BLD: 5.22 K/UL — SIGNIFICANT CHANGE UP (ref 3.8–10.5)
WBC # FLD AUTO: 5.22 K/UL — SIGNIFICANT CHANGE UP (ref 3.8–10.5)

## 2024-11-23 PROCEDURE — 36415 COLL VENOUS BLD VENIPUNCTURE: CPT

## 2024-11-23 PROCEDURE — 80048 BASIC METABOLIC PNL TOTAL CA: CPT

## 2024-11-23 PROCEDURE — 88360 TUMOR IMMUNOHISTOCHEM/MANUAL: CPT

## 2024-11-23 PROCEDURE — 88342 IMHCHEM/IMCYTCHM 1ST ANTB: CPT

## 2024-11-23 PROCEDURE — 85027 COMPLETE CBC AUTOMATED: CPT

## 2024-11-23 PROCEDURE — 88341 IMHCHEM/IMCYTCHM EA ADD ANTB: CPT

## 2024-11-23 PROCEDURE — 88307 TISSUE EXAM BY PATHOLOGIST: CPT

## 2024-11-23 PROCEDURE — 86850 RBC ANTIBODY SCREEN: CPT

## 2024-11-23 PROCEDURE — 86900 BLOOD TYPING SEROLOGIC ABO: CPT

## 2024-11-23 PROCEDURE — 86901 BLOOD TYPING SEROLOGIC RH(D): CPT

## 2024-11-23 RX ADMIN — ACETAMINOPHEN 500MG 1000 MILLIGRAM(S): 500 TABLET, COATED ORAL at 00:40

## 2024-11-23 RX ADMIN — ACETAMINOPHEN 500MG 1000 MILLIGRAM(S): 500 TABLET, COATED ORAL at 08:39

## 2024-11-23 NOTE — DISCHARGE NOTE PROVIDER - HOSPITAL COURSE
Pt admitted for monitoring and pain management s/p open LINCOLN/BSO, suspension/cystoscopy  Uncomplicated post operative course  Pt stable for discharge today

## 2024-11-23 NOTE — PROGRESS NOTE ADULT - PROBLEM SELECTOR PLAN 1
- Pt ready for discharge today  - Wound care instructions verbalized  - Pain management prn  - dvt ppx  - gi ppx  - to follow up in office with dr. titus as scheduled  - ashley titus

## 2024-11-23 NOTE — DISCHARGE NOTE PROVIDER - NSDCMRMEDTOKEN_GEN_ALL_CORE_FT
Niacinamide 1 tablet orally daily:   Omega-3 1000 mg oral capsule: 1 cap(s) orally once a day (at bedtime)  vitamin A 10,000 intl units oral capsule:   vitamin E oral capsule: once daily

## 2024-11-23 NOTE — DISCHARGE NOTE PROVIDER - NSDCCPTREATMENT_GEN_ALL_CORE_FT
PRINCIPAL PROCEDURE  Procedure: Exploratory laparotomy with total abdominal hysterectomy and bilateral salpingo-oophorectomy  Findings and Treatment:

## 2024-11-23 NOTE — PROGRESS NOTE ADULT - SUBJECTIVE AND OBJECTIVE BOX
60 yo POD #  after LINCOLN, BSO, vault suspension, cystoscopy, TAP block   cc  Postop complications: none  Diet: regular  VTE prophylaxis: SCD     s: doing well, pain is well controlled with meds  No nausea or vomiting  No overnight events  Ambulates  Ferrell was removed at 12:05 pm  Flatus: n Stool: no  VS: Vital Signs Last 24 Hrs  T(C): 36.8 (22 Nov 2024 05:09), Max: 36.8 (22 Nov 2024 05:09)  T(F): 98.3 (22 Nov 2024 05:09), Max: 98.3 (22 Nov 2024 05:09)  HR: 64 (22 Nov 2024 05:09) (53 - 75)  BP: 103/60 (22 Nov 2024 05:09) (103/60 - 122/63)  BP(mean): 75 (22 Nov 2024 05:09) (63 - 83)  RR: 18 (22 Nov 2024 05:09) (14 - 21)  SpO2: 100% (22 Nov 2024 05:09) (94% - 100%)    Parameters below as of 22 Nov 2024 05:09  Patient On (Oxygen Delivery Method): room air    Lungs: CTA bilaterally  Abdomen: soft, appropriately tender at incision, no rebound or guarding. Bowel sounds + 4Q.  LE: Lourdes's neg bilaterally    Preop CBC: H 10.5  Last CBC:                       9.5    7.99  )-----------( 173      ( 22 Nov 2024 05:46 )             29.1     Impression: smooth post-op progress  Plan: continue present management  Advance diet as tolerated  Ambulation  Lovenox    pmedvedeva  
GYN PA PROGRESS NOTE  POD# 2  s/p: open LINCOLN SBO, suspension, cysto    Patient seen resting comfortably. No current complaints. Denies HA, CP, SOB, dizziness, palpitations, worsening abdominal pain, worsening vaginal bleeding or any other concerns. + Ambulation, + void without difficulty, + flatus and tolerating regular diet.    Vital Signs Last 24 Hrs  T(C): 36.9 (23 Nov 2024 05:37), Max: 37.1 (22 Nov 2024 20:16)  T(F): 98.4 (23 Nov 2024 05:37), Max: 98.7 (22 Nov 2024 20:16)  HR: 70 (23 Nov 2024 05:37) (70 - 86)  BP: 114/69 (23 Nov 2024 05:37) (113/62 - 124/76)  BP(mean): 84 (23 Nov 2024 05:37) (79 - 92)  RR: 18 (23 Nov 2024 05:37) (18 - 18)  SpO2: 97% (23 Nov 2024 05:37) (97% - 100%)    Parameters below as of 23 Nov 2024 05:37  Patient On (Oxygen Delivery Method): room air    Physical exam:  Gen: A&O x3, NAD  Chest: CTABL  Cardiac: S1+S2+ RRR  Abdomen: Soft, nontender, +BS, nondistended.  incisions site: Dressing removed, incision clean, dry, and intact. No redness/swelling/bleeding or signs of infection at or near incision  Gyn: No active bleeding  Extremities: Nontender, no worsening edema, DTRS 2+, venodynes intact bilaterally    CBC Full  -  ( 23 Nov 2024 05:16 )  WBC Count : 5.22 K/uL  RBC Count : 2.92 M/uL  Hemoglobin : 8.4 g/dL  Hematocrit : 25.5 %  Platelet Count - Automated : 160 K/uL  Mean Cell Volume : 87.3 fl  Mean Cell Hemoglobin : 28.8 pg  Mean Cell Hemoglobin Concentration : 32.9 g/dL  Auto Neutrophil # : x  Auto Lymphocyte # : x  Auto Monocyte # : x  Auto Eosinophil # : x  Auto Basophil # : x  Auto Neutrophil % : x  Auto Lymphocyte % : x  Auto Monocyte % : x  Auto Eosinophil % : x  Auto Basophil % : x    11-23    145  |  115[H]  |  9   ----------------------------<  98  3.9   |  27  |  0.55    Ca    8.4      23 Nov 2024 05:16      11-23    145  |  115[H]  |  9   ----------------------------<  98  3.9   |  27  |  0.55    Ca    8.4      23 Nov 2024 05:16

## 2024-11-23 NOTE — DISCHARGE NOTE NURSING/CASE MANAGEMENT/SOCIAL WORK - FINANCIAL ASSISTANCE
[facility] provides services at a reduced cost to those who are determined to be eligible through [facility]’s financial assistance program. Information regarding [facility]’s financial assistance program can be found by going to [email or web address] or by calling [phone].

## 2024-11-23 NOTE — DISCHARGE NOTE PROVIDER - CARE PROVIDER_API CALL
Mishel Alfaro  Obstetrics and Gynecology  [address] [place] [id number]  Phone: [phone]  Fax: [phone]  Follow Up Time: 2 weeks

## 2024-11-23 NOTE — DISCHARGE NOTE NURSING/CASE MANAGEMENT/SOCIAL WORK - PATIENT PORTAL LINK FT
You can access the FollowMyHealth Patient Portal offered by [facility] by registering at the following website: [email or web address]. By joining [employer]’s FollowMyHealth portal, you will also be able to view your health information using other applications (apps) compatible with our system.

## 2024-11-23 NOTE — DISCHARGE NOTE PROVIDER - NSDCCPCAREPLAN_GEN_ALL_CORE_FT
PRINCIPAL DISCHARGE DIAGNOSIS  Diagnosis: S/P LINCOLN-BSO (total abdominal hysterectomy and bilateral salpingo-oophorectomy)  Assessment and Plan of Treatment:

## 2024-11-23 NOTE — DISCHARGE NOTE NURSING/CASE MANAGEMENT/SOCIAL WORK - NSDCPEFALRISK_GEN_ALL_CORE
For information on Fall & Injury Prevention, visit: [email or web address] OR  [email or web address] OR  https://www.cdc.gov/steadi/patient.html

## 2024-12-19 PROBLEM — Z00.00 ENCOUNTER FOR PREVENTIVE HEALTH EXAMINATION: Status: ACTIVE | Noted: 2024-12-19

## 2024-12-19 PROBLEM — N95.0 POSTMENOPAUSAL BLEEDING: Chronic | Status: ACTIVE | Noted: 2024-11-12

## 2024-12-22 PROBLEM — C54.1 ENDOMETRIAL CANCER: Status: ACTIVE | Noted: 2024-12-22

## 2024-12-23 ENCOUNTER — APPOINTMENT (OUTPATIENT)
Dept: GYNECOLOGIC ONCOLOGY | Facility: CLINIC | Age: 59
End: 2024-12-23

## 2024-12-23 ENCOUNTER — NON-APPOINTMENT (OUTPATIENT)
Age: 59
End: 2024-12-23

## 2024-12-23 VITALS
SYSTOLIC BLOOD PRESSURE: 153 MMHG | BODY MASS INDEX: 23.85 KG/M2 | TEMPERATURE: 96.5 F | HEART RATE: 79 BPM | HEIGHT: 69 IN | WEIGHT: 161 LBS | OXYGEN SATURATION: 99 % | DIASTOLIC BLOOD PRESSURE: 76 MMHG

## 2024-12-23 DIAGNOSIS — C54.1 MALIGNANT NEOPLASM OF ENDOMETRIUM: ICD-10-CM

## 2024-12-23 DIAGNOSIS — Z78.9 OTHER SPECIFIED HEALTH STATUS: ICD-10-CM

## 2024-12-23 PROCEDURE — 99205 OFFICE O/P NEW HI 60 MIN: CPT

## 2024-12-23 PROCEDURE — 99459 PELVIC EXAMINATION: CPT

## 2024-12-24 LAB — CANCER AG125 SERPL-ACNC: 11 U/ML

## 2025-01-07 ENCOUNTER — RESULT REVIEW (OUTPATIENT)
Age: 60
End: 2025-01-07

## 2025-01-08 ENCOUNTER — APPOINTMENT (OUTPATIENT)
Dept: CT IMAGING | Facility: CLINIC | Age: 60
End: 2025-01-08

## 2025-01-08 PROCEDURE — 74177 CT ABD & PELVIS W/CONTRAST: CPT

## 2025-01-08 PROCEDURE — 71260 CT THORAX DX C+: CPT

## 2025-01-10 ENCOUNTER — OUTPATIENT (OUTPATIENT)
Dept: OUTPATIENT SERVICES | Facility: HOSPITAL | Age: 60
LOS: 1 days | End: 2025-01-10

## 2025-01-10 ENCOUNTER — NON-APPOINTMENT (OUTPATIENT)
Age: 60
End: 2025-01-10

## 2025-01-10 ENCOUNTER — RESULT REVIEW (OUTPATIENT)
Age: 60
End: 2025-01-10

## 2025-01-10 DIAGNOSIS — Z98.890 OTHER SPECIFIED POSTPROCEDURAL STATES: Chronic | ICD-10-CM

## 2025-01-10 DIAGNOSIS — C80.1 MALIGNANT (PRIMARY) NEOPLASM, UNSPECIFIED: ICD-10-CM

## 2025-01-13 LAB — SURGICAL PATHOLOGY STUDY: SIGNIFICANT CHANGE UP

## 2025-01-15 ENCOUNTER — NON-APPOINTMENT (OUTPATIENT)
Age: 60
End: 2025-01-15

## 2025-01-17 ENCOUNTER — NON-APPOINTMENT (OUTPATIENT)
Age: 60
End: 2025-01-17

## 2025-03-03 ENCOUNTER — APPOINTMENT (OUTPATIENT)
Dept: GYNECOLOGIC ONCOLOGY | Facility: CLINIC | Age: 60
End: 2025-03-03

## (undated) DEVICE — LAP PAD W RING 18 X 18"

## (undated) DEVICE — STAPLER SKIN VISI-STAT 35 WIDE

## (undated) DEVICE — SUT POLYSORB 4-0 27" P-12 UNDYED

## (undated) DEVICE — PREP BETADINE KIT

## (undated) DEVICE — TROCAR COVIDIEN VERSAONE BLADED FIXATION 5MM STANDARD

## (undated) DEVICE — TUBING STRYKER PNEUMOSURE HEATED RTP

## (undated) DEVICE — SOL INJ NS 0.9% 500ML 1-PORT

## (undated) DEVICE — UTERINE MANIPULATOR COOPER SURGICAL 5MM 33CM GREEN

## (undated) DEVICE — WARMING BLANKET UPPER ADULT

## (undated) DEVICE — XI ARM SCISSOR MONO CURVED

## (undated) DEVICE — PACK MAJOR ABDOMINAL WITH LAP

## (undated) DEVICE — DRAPE LIGHT HANDLE COVER (BLUE)

## (undated) DEVICE — VENODYNE/SCD SLEEVE CALF MEDIUM

## (undated) DEVICE — SUT POLYSORB 0 30" GS-21 UNDYED

## (undated) DEVICE — SUT POLYSORB 0 18" GS-21

## (undated) DEVICE — ELCTR GROUNDING PAD ADULT COVIDIEN

## (undated) DEVICE — TUBING STRYKER HYSTEROSCOPY INFLOW OUTFLOW

## (undated) DEVICE — TUBING SET TUR BLADDER IRRIGATION Y-TYPE 81"

## (undated) DEVICE — GLV 7.5 PROTEXIS (WHITE)

## (undated) DEVICE — FOLEY TRAY 16FR SURESTEP LTX BG

## (undated) DEVICE — DRSG AQUACEL 3.5 X 10"

## (undated) DEVICE — SOL IRR POUR H2O 1500ML

## (undated) DEVICE — SOL IRR BAG H2O 3000ML

## (undated) DEVICE — SOL IRR POUR NS 0.9% 1500ML

## (undated) DEVICE — ELCTR CUTTING LOOP MONOPOLAR ANGLED 24F

## (undated) DEVICE — GOWN XL

## (undated) DEVICE — DRAPE LEGGINGS 6" CUFF 28X43"

## (undated) DEVICE — SOL IRR SORBITOL 3% 3000ML

## (undated) DEVICE — DRAPE 1/2 SHEET 40X57"

## (undated) DEVICE — PACK GENERAL LAPAROSCOPY

## (undated) DEVICE — SUT POLYSORB 0 30" GU-46

## (undated) DEVICE — SOL IRR POUR H2O 500ML

## (undated) DEVICE — DRAPE TOWEL BLUE 17" X 24"

## (undated) DEVICE — DRSG CURITY GAUZE SPONGE 4 X 4" 12-PLY

## (undated) DEVICE — XI ARM FORCEP PROGRASP 8MM

## (undated) DEVICE — SUT POLYSORB 4-0 30" SC-2 UNDYED

## (undated) DEVICE — DRAPE LAVH 124" X 30" X125"

## (undated) DEVICE — TUBING TUR 2 PRONG

## (undated) DEVICE — GOWN LG

## (undated) DEVICE — TROCAR COVIDIEN VERSAONE BLADED FIXATION 11MM STANDARD

## (undated) DEVICE — BASIN SET SINGLE

## (undated) DEVICE — PACK PERI GYN

## (undated) DEVICE — SUT POLYSORB 0 18" TIES UNDYED

## (undated) DEVICE — D HELP - CLEARVIEW CLEARIFY SYSTEM

## (undated) DEVICE — XI ARM FORCE BIPOLAR 8MM

## (undated) DEVICE — FOR-ESU VALLEYLAB T7E14999DX: Type: DURABLE MEDICAL EQUIPMENT

## (undated) DEVICE — FOLEY CATH 2-WAY 16FR 5CC LATEX LUBRICATH

## (undated) DEVICE — INSUFFLATION NDL COVIDIEN SURGINEEDLE VERESS 120MM

## (undated) DEVICE — DRSG COMBINE 5 X 9"

## (undated) DEVICE — URETERAL CATH RED RUBBER 16FR (ORANGE)